# Patient Record
Sex: FEMALE | Race: WHITE | URBAN - METROPOLITAN AREA
[De-identification: names, ages, dates, MRNs, and addresses within clinical notes are randomized per-mention and may not be internally consistent; named-entity substitution may affect disease eponyms.]

---

## 2018-02-24 ENCOUNTER — HOSPITAL ENCOUNTER (EMERGENCY)
Facility: CLINIC | Age: 28
Discharge: HOME | End: 2018-02-24
Attending: EMERGENCY MEDICINE | Admitting: EMERGENCY MEDICINE
Payer: COMMERCIAL

## 2018-02-24 VITALS
WEIGHT: 135 LBS | SYSTOLIC BLOOD PRESSURE: 121 MMHG | TEMPERATURE: 100 F | RESPIRATION RATE: 16 BRPM | DIASTOLIC BLOOD PRESSURE: 86 MMHG | OXYGEN SATURATION: 98 % | HEART RATE: 107 BPM | BODY MASS INDEX: 22.12 KG/M2

## 2018-02-24 DIAGNOSIS — J32.9 VIRAL SINUSITIS: ICD-10-CM

## 2018-02-24 DIAGNOSIS — H10.32 ACUTE CONJUNCTIVITIS OF LEFT EYE, UNSPECIFIED ACUTE CONJUNCTIVITIS TYPE: ICD-10-CM

## 2018-02-24 DIAGNOSIS — B97.89 VIRAL SINUSITIS: ICD-10-CM

## 2018-02-24 PROCEDURE — 25000132 ZZH RX MED GY IP 250 OP 250 PS 637: Performed by: EMERGENCY MEDICINE

## 2018-02-24 PROCEDURE — 99283 EMERGENCY DEPT VISIT LOW MDM: CPT

## 2018-02-24 RX ORDER — IBUPROFEN 600 MG/1
600 TABLET, FILM COATED ORAL ONCE
Status: COMPLETED | OUTPATIENT
Start: 2018-02-24 | End: 2018-02-24

## 2018-02-24 RX ORDER — PSEUDOEPHEDRINE HCL 120 MG/1
120 TABLET, FILM COATED, EXTENDED RELEASE ORAL ONCE
Status: DISCONTINUED | OUTPATIENT
Start: 2018-02-24 | End: 2018-02-24

## 2018-02-24 RX ORDER — POLYMYXIN B SULFATE AND TRIMETHOPRIM 1; 10000 MG/ML; [USP'U]/ML
1 SOLUTION OPHTHALMIC
Qty: 1 BOTTLE | Refills: 0 | Status: SHIPPED | OUTPATIENT
Start: 2018-02-24 | End: 2018-03-03

## 2018-02-24 RX ORDER — PSEUDOEPHEDRINE HCL 30 MG
60 TABLET ORAL ONCE
Status: COMPLETED | OUTPATIENT
Start: 2018-02-24 | End: 2018-02-24

## 2018-02-24 RX ADMIN — IBUPROFEN 600 MG: 600 TABLET ORAL at 17:29

## 2018-02-24 RX ADMIN — PSEUDOEPHEDRINE HCL 60 MG: 30 TABLET, FILM COATED ORAL at 17:55

## 2018-02-24 ASSESSMENT — ENCOUNTER SYMPTOMS
NAUSEA: 0
CONSTIPATION: 0
VOMITING: 0
ABDOMINAL PAIN: 0
CHILLS: 0
FEVER: 0
DYSURIA: 0
COUGH: 1
DIARRHEA: 0
SINUS PRESSURE: 1
HEMATURIA: 0
SHORTNESS OF BREATH: 0

## 2018-02-24 NOTE — ED NOTES
Reports nasal congestion, bilateral ear pain, sinus pain, left eye pain and drainage. ABCs intact.

## 2018-02-24 NOTE — ED AVS SNAPSHOT
[facility] Emergency Department    [address]    [facility] [address]    Phone:  [phone]    Fax:  [phone]                                       Baylee Abel   MRN: [id number]    Department:  [facility] Emergency Department   Date of Visit:  2/24/2018           After Visit Summary Signature Page     I have received my discharge instructions, and my questions have been answered. I have discussed any challenges I see with this plan with the nurse or doctor.    ..........................................................................................................................................  Patient/Patient Representative Signature      ..........................................................................................................................................  Patient Representative Print Name and Relationship to Patient    ..................................................               ................................................  Date                                            Time    ..........................................................................................................................................  Reviewed by Signature/Title    ...................................................              ..............................................  Date                                                            Time

## 2018-02-24 NOTE — ED AVS SNAPSHOT
[facility] Emergency Department    [address]    [facility] [address]    Phone:  [phone]    Fax:  [phone]                                       Baylee Abel   MRN: [id number]    Department:  [facility] Emergency Department   Date of Visit:  2/24/2018           Patient Information     Date Of Birth          1990        Your diagnoses for this visit were:     Viral sinusitis     Acute conjunctivitis of left eye, unspecified acute conjunctivitis type        You were seen by Axel Boyer MD.      Follow-up Information     Follow up with [facility] Emergency Department.    Specialty:  EMERGENCY MEDICINE    Why:  As needed    Contact information:    [address]  [facility] [address] [phone]        Follow up with Willis, Noemy Díaz In 3 days.    Why:  As needed    Contact information:    [address] [facility] [address]  [place] [address]  [phone]          Discharge Instructions       Take the below medications as prescribed.  Please do not miss any doses.    New Prescriptions    TRIMETHOPRIM-POLYMYXIN B (POLYTRIM) OPHTHALMIC SOLUTION    Apply 1 drop to eye every 3 hours for 7 days     1. -Take acetaminophen 500 to 1000 mg by mouth every 4 to 6 hours as needed for pain or fever.  Do not take more than 4000 mg in 24 hours.  Do not take within 6 hours of another acetaminophen containing medication such as norco (vicodin) or percocet.  - Take ibuprofen 600 to 800 mg by mouth every 6 to 8 hours as needed for pain or fever  2.  Take over-the-counter decongestant such as Sudafed.  3.  Please see primary doctor in 4 days if you have fever greater than 100.4 Fahrenheit, persistent sinus pain and headache.  At this point he may need antibiotics.   4.  Please return to the emergency department as needed for new or worsening symptoms including difficulty breathing, severe headaches, confusion, neck pain or  stiffness, any other concerning symptoms.    Discharge Instructions  Upper Respiratory Infection    The upper respiratory tract includes the sinuses, nasal passages, pharynx, and larynx. A URI, or upper respiratory infection, is an infection of any of the parts of the upper airway. Symptoms include runny nose, congestion, sneezing, sore throat, cough, and fever. URIs are almost always caused by a virus. Antibiotics do not help with viral infections, so are generally not prescribed. A URI is very contagious through coughing and nasal secretions; make sure you wash your hands often and clean surfaces after sneezing, coughing or touching them. While you should start to improve in 3 - 5 days, remember that sometimes a cough can linger for several weeks.    Generally, every Emergency Department visit should have a follow-up clinic visit with either a primary or a specialty clinic/provider. Please follow-up as instructed by your emergency provider today.    Return to the Emergency Department if:    Any of your symptoms get much worse.    You seem very sick, like being too weak to get up.    You have chest pain or shortness of breath.     You have a severe headache.    You are vomiting (throwing up) so much you cannot keep fluids or medicines down.    You have confusion or seem unusually drowsy.    You have a seizure.    What can I do to help myself?    Fill any prescriptions the provider gave you and take them right away    If you have a fever, get plenty of rest and drink lots of fluids, especially water.    Using a humidifier or saline nose spray will also help loosen mucous.     Clothes or blankets will not change your fever. Do what is comfortable for you.    Bathing or sponging in lukewarm water may help you feel better.    Acetaminophen (Tylenol ) or ibuprofen (Advil , Motrin ) will help bring fever down and may help you feel more comfortable. Be sure to read and follow the package directions, and ask your provider  if you have questions.    Do not drink alcohol.    Decongestants may help you feel better. You may use decongestant nose sprays Afrin  (oxymetazoline) or Kareem-Synephrine  (phenylephrine hydrochloride) for up to 3 days, or may use a decongestant tablet like Sudafed  (pseudoephedrine).  If you were given a prescription for medicine here today, be sure to read all of the information (including the package insert) that comes with your prescription.  This will include important information about the medicine, its side effects, and any warnings that you need to know about.  The pharmacist who fills the prescription can provide more information and answer questions you may have about the medicine.  If you have questions or concerns that the pharmacist cannot address, please call or return to the Emergency Department.   Remember that you can always come back to the Emergency Department if you are not able to see your regular provider in the amount of time listed above, if you get any new symptoms, or if there is anything that worries you.      Discharge References/Attachments     CONJUNCTIVITIS, NONSPECIFIC (ENGLISH)    SINUSITIS (NO ANTIBIOTICS) (ENGLISH)      24 Hour Appointment Hotline       To make an appointment at any [facility], call [phone] ([phone]). If you don't have a family doctor or clinic, we will help you find one. [facility] clinics are conveniently located to serve the needs of you and your family.             Review of your medicines      START taking        Dose / Directions Last dose taken    trimethoprim-polymyxin b ophthalmic solution   Commonly known as:  POLYTRIM   Dose:  1 drop   Quantity:  1 Bottle        Apply 1 drop to eye every 3 hours for 7 days   Refills:  0                Prescriptions were sent or printed at these locations (1 Prescription)                   Other Prescriptions                Printed at Department/Unit printer (1 of 1)         trimethoprim-polymyxin b  (POLYTRIM) ophthalmic solution                Orders Needing Specimen Collection     None      Pending Results     No orders found from 2/22/2018 to 2/25/2018.            Pending Culture Results     No orders found from 2/22/2018 to 2/25/2018.            Pending Results Instructions     If you had any lab results that were not finalized at the time of your Discharge, you can call the ED Lab Result RN at [phone]. You will be contacted by this team for any positive Lab results or changes in treatment. The nurses are available 7 days a week from 10A to 6:30P.  You can leave a message 24 hours per day and they will return your call.        Test Results From Your Hospital Stay               Clinical Quality Measure: Blood Pressure Screening     Your blood pressure was checked while you were in the emergency department today. The last reading we obtained was  BP: 121/86 . Please read the guidelines below about what these numbers mean and what you should do about them.  If your systolic blood pressure (the top number) is less than 120 and your diastolic blood pressure (the bottom number) is less than 80, then your blood pressure is normal. There is nothing more that you need to do about it.  If your systolic blood pressure (the top number) is 120-139 or your diastolic blood pressure (the bottom number) is 80-89, your blood pressure may be higher than it should be. You should have your blood pressure rechecked within a year by a primary care provider.  If your systolic blood pressure (the top number) is 140 or greater or your diastolic blood pressure (the bottom number) is 90 or greater, you may have high blood pressure. High blood pressure is treatable, but if left untreated over time it can put you at risk for heart attack, stroke, or kidney failure. You should have your blood pressure rechecked by a primary care provider within the next 4 weeks.  If your provider in the emergency department today gave you specific  "instructions to follow-up with your doctor or provider even sooner than that, you should follow that instruction and not wait for up to 4 weeks for your follow-up visit.        Thank you for choosing [facility]       Thank you for choosing [facility] for your care. Our goal is always to provide you with excellent care. Hearing back from our patients is one way we can continue to improve our services. Please take a few minutes to complete the written survey that you may receive in the mail after you visit with us. Thank you!        [facility] [employer] Information     [facility] lets you send messages to your doctor, view your test results, renew your prescriptions, schedule appointments and more. To sign up, go to [email or web address] . Click on \"Log in\" on the left side of the screen, which will take you to the Welcome page. Then click on \"Sign up Now\" on the right side of the page.     You will be asked to enter the access code listed below, as well as some personal information. Please follow the directions to create your username and password.     Your access code is: [email or web address]  Expires: 2018  7:09 PM     Your access code will  in 90 days. If you need help or a new code, please call your [facility] clinic or [phone].        Care EveryWhere ID     This is your Care EveryWhere ID. This could be used by other organizations to access your [facility] medical records  [id number]        Equal Access to Services     SAVANAH DOMINIQUE : Hadii elvira New, waaxda gregory, qaybta kaalmafannie ayon . So [facility] [phone].    ATENCIÓN: Si habla español, tiene a serrano disposición servicios gratuitos de asistencia lingüística. Radhaame al [phone].    We comply with applicable federal civil rights laws and [place] laws. We do not discriminate on the basis of race, color, national origin, age, disability, sex, sexual orientation, or gender identity.            After Visit " Summary       This is your record. Keep this with you and show to your community pharmacist(s) and doctor(s) at your next visit.

## 2018-02-24 NOTE — ED PROVIDER NOTES
History     Chief Complaint:  Otalgia    HPI   Baylee Abel is a generally healthy 27 year old female who presents with otalgia. The patient states that she has had 2 days of worsening bilateral ear pain and sinus pressure, prompting her ED visit. Here, the patient endorses a slight cough, but denies chest pain, abdominal pain, nausea, vomiting, urinary symptoms or bowel changes, as well myalgias. She additionally states that she has had sinus infection in the past, but has not been prescribed antibiotics.  Denies neck pain, stiffness, vision changes.    Allergies:  NKDA     Medications:    The patient is currently on no regular medications.     Past Medical History:    ASCUS favoring dysplasia    Past Surgical History:    The patient does not have any pertinent past surgical history.     Family History:    Depression    Social History:  Presents with her significant other.   Former Smoker  Positive for alcohol use.    Marital Status:  Single [1]     Review of Systems   Constitutional: Negative for chills and fever.   HENT: Positive for ear pain and sinus pressure.    Respiratory: Positive for cough. Negative for shortness of breath.    Cardiovascular: Negative for chest pain.   Gastrointestinal: Negative for abdominal pain, constipation, diarrhea, nausea and vomiting.   Genitourinary: Negative for dysuria and hematuria.   All other systems reviewed and are negative.      Physical Exam   First Vitals:  BP: 121/86  Pulse: 107  Heart Rate: 107  Temp: 98.1  F (36.7  C)  Resp: 22  Weight: 61.2 kg (135 lb)  SpO2: 95 %    Physical Exam  Constitutional: Well developed, nontox appearance  Head: Atraumatic. TMs clear bilaterally, no mastoid tenderness, mild bilateral maxillary sinus tenderness  Mouth/Throat: Oropharynx is clear and moist.   Neck:  no stridor, full range of motion, no LAD, no meningismus  Eyes: no scleral icterus, left eye conjunctival infection without drainage, PERRL, EOMI  Cardiovascular: RRR, 2+  bilat radial pulses  Pulmonary/Chest: nml resp effort, Clear BS bilat  Abdominal: ND, +BS, soft, NT, no rebound or guarding   : no CVA tenderness bilat  Ext: Warm, well perfused, no edema  Neurological: A&O, symmetric facies, moves ext x4  Skin: Skin is warm and dry.   Psychiatric: Behavior is normal. Thought content normal.   Nursing note and vitals reviewed.    Emergency Department Course     Interventions:   1729 Ibuprofen, 600 mg, PO  1755 Sudafed, 60 mg, PO    Emergency Department Course:  Nursing notes and vitals reviewed.     1709  I performed an exam of the patient as documented above.     Medicine administered as documented above.     185 I rechecked the patient and discussed the results of her workup thus far.     Findings and plan explained to the Patient. Patient discharged home with instructions regarding supportive care, medications, and reasons to return. The importance of close follow-up was reviewed. The patient was prescribed Polytrim.    Impression & Plan      Medical Decision Makin-year-old female presenting with nasal congestion, bilateral ear pain     Patient's presentation is consistent with viral URI, viral sinusitis.  She had no mastoid tenderness and her TMs are clear bilaterally.  Doubt mastoiditis.  There is no evidence of meningitis on exam today as well.  Given her symptoms have been going on for 3-4 days and she is afebrile in the emergency department, I do not think antibiotic treatment is indicated for sinusitis at this time.  She does have unilateral conjunctival injection which may be bacterial in nature although it seems most likely that this is viral given her other symptoms.  She is given a prescription for Polytrim and advised to use over-the-counter decongestants as well as Tylenol and ibuprofen at home.  Recommendations were given regarding follow-up with her primary care doctor and return to the emergency department as needed for new or worsening symptoms.  She is  counseled on the diagnosis and disposition prior to discharge.  She is understanding and agreeable to plan.  Patient subsequently discharged in stable condition.  Heart rate improved prior to discharge.    Diagnosis:    ICD-10-CM   1. Viral sinusitis J32.9    B97.89   2. Acute conjunctivitis of left eye, unspecified acute conjunctivitis type H10.32       Disposition:  discharged to home    Discharge Medications:  New Prescriptions    TRIMETHOPRIM-POLYMYXIN B (POLYTRIM) OPHTHALMIC SOLUTION    Apply 1 drop to eye every 3 hours for 7 days     Ayla BRICEÑO am serving as a scribe on 2/24/2018 at 5:19 PM to personally document services performed by Axel Boyer MD based on my observations and the provider's statements to me.      Ayla Abel  2/24/2018   [facility] [place] EMERGENCY DEPARTMENT       Axel Boyer MD  02/25/18 1399

## 2018-02-25 NOTE — DISCHARGE INSTRUCTIONS
Take the below medications as prescribed.  Please do not miss any doses.    New Prescriptions    TRIMETHOPRIM-POLYMYXIN B (POLYTRIM) OPHTHALMIC SOLUTION    Apply 1 drop to eye every 3 hours for 7 days     1. -Take acetaminophen 500 to 1000 mg by mouth every 4 to 6 hours as needed for pain or fever.  Do not take more than 4000 mg in 24 hours.  Do not take within 6 hours of another acetaminophen containing medication such as norco (vicodin) or percocet.  - Take ibuprofen 600 to 800 mg by mouth every 6 to 8 hours as needed for pain or fever  2.  Take over-the-counter decongestant such as Sudafed.  3.  Please see primary doctor in 4 days if you have fever greater than 100.4 Fahrenheit, persistent sinus pain and headache.  At this point he may need antibiotics.   4.  Please return to the emergency department as needed for new or worsening symptoms including difficulty breathing, severe headaches, confusion, neck pain or stiffness, any other concerning symptoms.    Discharge Instructions  Upper Respiratory Infection    The upper respiratory tract includes the sinuses, nasal passages, pharynx, and larynx. A URI, or upper respiratory infection, is an infection of any of the parts of the upper airway. Symptoms include runny nose, congestion, sneezing, sore throat, cough, and fever. URIs are almost always caused by a virus. Antibiotics do not help with viral infections, so are generally not prescribed. A URI is very contagious through coughing and nasal secretions; make sure you wash your hands often and clean surfaces after sneezing, coughing or touching them. While you should start to improve in 3 - 5 days, remember that sometimes a cough can linger for several weeks.    Generally, every Emergency Department visit should have a follow-up clinic visit with either a primary or a specialty clinic/provider. Please follow-up as instructed by your emergency provider today.    Return to the Emergency Department if:    Any of your  symptoms get much worse.    You seem very sick, like being too weak to get up.    You have chest pain or shortness of breath.     You have a severe headache.    You are vomiting (throwing up) so much you cannot keep fluids or medicines down.    You have confusion or seem unusually drowsy.    You have a seizure.    What can I do to help myself?    Fill any prescriptions the provider gave you and take them right away    If you have a fever, get plenty of rest and drink lots of fluids, especially water.    Using a humidifier or saline nose spray will also help loosen mucous.     Clothes or blankets will not change your fever. Do what is comfortable for you.    Bathing or sponging in lukewarm water may help you feel better.    Acetaminophen (Tylenol ) or ibuprofen (Advil , Motrin ) will help bring fever down and may help you feel more comfortable. Be sure to read and follow the package directions, and ask your provider if you have questions.    Do not drink alcohol.    Decongestants may help you feel better. You may use decongestant nose sprays Afrin  (oxymetazoline) or Kareem-Synephrine  (phenylephrine hydrochloride) for up to 3 days, or may use a decongestant tablet like Sudafed  (pseudoephedrine).  If you were given a prescription for medicine here today, be sure to read all of the information (including the package insert) that comes with your prescription.  This will include important information about the medicine, its side effects, and any warnings that you need to know about.  The pharmacist who fills the prescription can provide more information and answer questions you may have about the medicine.  If you have questions or concerns that the pharmacist cannot address, please call or return to the Emergency Department.   Remember that you can always come back to the Emergency Department if you are not able to see your regular provider in the amount of time listed above, if you get any new symptoms, or if there is  anything that worries you.